# Patient Record
Sex: FEMALE | Race: WHITE | NOT HISPANIC OR LATINO
[De-identification: names, ages, dates, MRNs, and addresses within clinical notes are randomized per-mention and may not be internally consistent; named-entity substitution may affect disease eponyms.]

---

## 2024-07-09 ENCOUNTER — NON-APPOINTMENT (OUTPATIENT)
Age: 74
End: 2024-07-09

## 2024-07-09 ENCOUNTER — APPOINTMENT (OUTPATIENT)
Dept: CARDIOLOGY | Facility: CLINIC | Age: 74
End: 2024-07-09
Payer: MEDICARE

## 2024-07-09 VITALS
SYSTOLIC BLOOD PRESSURE: 128 MMHG | DIASTOLIC BLOOD PRESSURE: 64 MMHG | HEART RATE: 91 BPM | OXYGEN SATURATION: 94 % | BODY MASS INDEX: 25.52 KG/M2 | HEIGHT: 63 IN | WEIGHT: 144 LBS

## 2024-07-09 VITALS — DIASTOLIC BLOOD PRESSURE: 68 MMHG | SYSTOLIC BLOOD PRESSURE: 124 MMHG

## 2024-07-09 DIAGNOSIS — E78.5 HYPERLIPIDEMIA, UNSPECIFIED: ICD-10-CM

## 2024-07-09 DIAGNOSIS — Z86.16 PERSONAL HISTORY OF COVID-19: ICD-10-CM

## 2024-07-09 DIAGNOSIS — R06.02 SHORTNESS OF BREATH: ICD-10-CM

## 2024-07-09 DIAGNOSIS — T46.6X5A ADVERSE EFFECT OF ANTIHYPERLIPIDEMIC AND ANTIARTERIOSCLEROTIC DRUGS, INITIAL ENCOUNTER: ICD-10-CM

## 2024-07-09 DIAGNOSIS — M25.473 EFFUSION, UNSPECIFIED ANKLE: ICD-10-CM

## 2024-07-09 DIAGNOSIS — Z78.9 OTHER SPECIFIED HEALTH STATUS: ICD-10-CM

## 2024-07-09 DIAGNOSIS — J44.9 CHRONIC OBSTRUCTIVE PULMONARY DISEASE, UNSPECIFIED: ICD-10-CM

## 2024-07-09 DIAGNOSIS — R68.84 JAW PAIN: ICD-10-CM

## 2024-07-09 DIAGNOSIS — I25.10 ATHEROSCLEROTIC HEART DISEASE OF NATIVE CORONARY ARTERY W/OUT ANGINA PECTORIS: ICD-10-CM

## 2024-07-09 DIAGNOSIS — Z87.898 PERSONAL HISTORY OF OTHER SPECIFIED CONDITIONS: ICD-10-CM

## 2024-07-09 DIAGNOSIS — R60.0 LOCALIZED EDEMA: ICD-10-CM

## 2024-07-09 DIAGNOSIS — R07.89 OTHER CHEST PAIN: ICD-10-CM

## 2024-07-09 DIAGNOSIS — I25.84 ATHEROSCLEROTIC HEART DISEASE OF NATIVE CORONARY ARTERY W/OUT ANGINA PECTORIS: ICD-10-CM

## 2024-07-09 DIAGNOSIS — F17.200 NICOTINE DEPENDENCE, UNSPECIFIED, UNCOMPLICATED: ICD-10-CM

## 2024-07-09 PROBLEM — Z00.00 ENCOUNTER FOR PREVENTIVE HEALTH EXAMINATION: Status: ACTIVE | Noted: 2024-07-09

## 2024-07-09 PROCEDURE — G2211 COMPLEX E/M VISIT ADD ON: CPT

## 2024-07-09 PROCEDURE — 93000 ELECTROCARDIOGRAM COMPLETE: CPT

## 2024-07-09 PROCEDURE — 99406 BEHAV CHNG SMOKING 3-10 MIN: CPT

## 2024-07-09 RX ORDER — ASPIRIN 81 MG/1
81 TABLET ORAL
Refills: 0 | Status: ACTIVE | COMMUNITY
Start: 2024-07-09

## 2024-07-09 RX ORDER — PANTOPRAZOLE 40 MG/1
40 TABLET, DELAYED RELEASE ORAL DAILY
Qty: 30 | Refills: 0 | Status: ACTIVE | COMMUNITY
Start: 2024-07-09 | End: 1900-01-01

## 2024-07-09 RX ORDER — FLUTICASONE FUROATE, UMECLIDINIUM BROMIDE AND VILANTEROL TRIFENATATE 200; 62.5; 25 UG/1; UG/1; UG/1
200-62.5-25 POWDER RESPIRATORY (INHALATION)
Refills: 0 | Status: ACTIVE | COMMUNITY
Start: 2024-07-09

## 2024-07-09 RX ORDER — ALBUTEROL SULFATE 90 UG/1
108 (90 BASE) INHALANT RESPIRATORY (INHALATION)
Refills: 0 | Status: ACTIVE | COMMUNITY
Start: 2024-07-09

## 2024-07-10 ENCOUNTER — TRANSCRIPTION ENCOUNTER (OUTPATIENT)
Age: 74
End: 2024-07-10

## 2024-07-15 RX ORDER — EVOLOCUMAB 140 MG/ML
140 INJECTION, SOLUTION SUBCUTANEOUS
Qty: 3 | Refills: 3 | Status: ACTIVE | COMMUNITY
Start: 2024-07-09

## 2024-07-23 ENCOUNTER — APPOINTMENT (OUTPATIENT)
Dept: CARDIOLOGY | Facility: CLINIC | Age: 74
End: 2024-07-23
Payer: MEDICARE

## 2024-07-23 PROCEDURE — A9502: CPT

## 2024-07-23 PROCEDURE — 78452 HT MUSCLE IMAGE SPECT MULT: CPT

## 2024-07-23 PROCEDURE — 93015 CV STRESS TEST SUPVJ I&R: CPT

## 2024-08-02 ENCOUNTER — APPOINTMENT (OUTPATIENT)
Dept: CARDIOLOGY | Facility: CLINIC | Age: 74
End: 2024-08-02
Payer: MEDICARE

## 2024-08-02 PROCEDURE — 93306 TTE W/DOPPLER COMPLETE: CPT

## 2024-08-07 ENCOUNTER — NON-APPOINTMENT (OUTPATIENT)
Age: 74
End: 2024-08-07

## 2024-08-07 ENCOUNTER — APPOINTMENT (OUTPATIENT)
Dept: CARDIOLOGY | Facility: CLINIC | Age: 74
End: 2024-08-07

## 2024-08-07 PROBLEM — F17.210 CIGARETTE SMOKER: Status: ACTIVE | Noted: 2024-08-07

## 2024-08-07 PROCEDURE — 99406 BEHAV CHNG SMOKING 3-10 MIN: CPT

## 2024-08-07 PROCEDURE — 93000 ELECTROCARDIOGRAM COMPLETE: CPT | Mod: 59

## 2024-08-07 PROCEDURE — 99214 OFFICE O/P EST MOD 30 MIN: CPT | Mod: 25

## 2024-08-07 NOTE — REASON FOR VISIT
[Symptom and Test Evaluation] : symptom and test evaluation [Hyperlipidemia] : hyperlipidemia [Coronary Artery Disease] : coronary artery disease [FreeTextEntry3] : Dr. Hudson  [FreeTextEntry1] :  74-year-old female is here to review her multiple cardiovascular test.  As you know she was  referred to me for consultation in presence of few months of substernal localized chest pressure pain mainly in the morning when she wakes up sometimes wakes her up at nighttime.  Last for around 3 minutes according to her.  And then she feels flushed sensation radiating to the jaw.  None during activity and exercises.  She remains very active.  And according to her after those 3 minutes in the morning she denies any further symptoms during the daytime.  Denies any relation to meals or respiration. She has no PND orthopnea.  No palpitation dizziness near syncopal syncopal event. She had another episode yesterday. She has no skin rash. She denies any history of myocardial infarction, coronary artery disease, cerebrovascular accident, peripheral artery disease, rheumatic fever, thyroid or Lyme disease. She has no history of hypertension. She does have a history of Extensive history of cigarette smoking Intermittent ankle edema Exertional dyspnea with emphysema Significant adverse reaction to multiple statins in the past in the form of significant myalgia Dyslipidemia.

## 2024-08-07 NOTE — DISCUSSION/SUMMARY
[EKG obtained to assist in diagnosis and management of assessed problem(s)] : EKG obtained to assist in diagnosis and management of assessed problem(s) [FreeTextEntry1] : 74-year-old with above medical history active medical problems as noted below 1.  Chest pain.  .  EKG nonischemic.  Mild coronary calcification score 96.  Nonischemic myocardial perfusion scan with preserved LV ejection fraction.  Possibility of gastroesophageal reflux disease as the etiology of nonexertional symptoms reviewed.  Continue with pantoprazole at present.  If no improvement and continued recurrence then consider invasive coronary angiography guided therapy.  Risk benefits alternatives and options of the further evaluation reviewed.  I have also recommended her to be seen by gastroenterologist.  Information given. Reviewed pathophysiology of atherosclerosis. Atherosclerosis: From the available data evidence of atherosclerosis was reviewed.  We have discussed atherosclerosis is a generalized process.  The pathophysiology of the atherosclerosis  and associated morbidity mortality were reviewed.  Lifestyle modification and risk factors associated with atherosclerosis were discussed.  changes needed in lifestyle and risk factors were reviewed at length.  Understands prevention is the best way  in decreasing morbidity mortality and overall improving long-term prognosis. she will continue with aspirin 81 mg  Pantoprazole 40 mg daily continue with repatha  She understands and agrees with the management plan. Smoking cessation at length discussed. Any high risk symptoms she will call 911 and go to nearest emergency room. 2.  Emphysema.  Pulmonary nodules.  Adrenal adenoma.  Strongly recommend to discuss with PMD see a pulmonologist and have further evaluation management.  Specifically in presence of history of smoking she is at a high risk. 3.  Significant dyslipidemia.  Unable to take statins.  PCSK9 inhibitors as discussed above. 4. cig smoking: Smoking cessation. Counseling done. Relationship with ASCVD, and associated morbidity, mortality was reviewed. Options available discussed.  Counseling regarding low saturated fat, salt and carbohydrate intake was reviewed. Active lifestyle and regular. Exercise along with weight management is advised. I have reviewed above at length. I answered all the questions. Patient verbalized understandings. Thank you very much for allowing me to participate in your patient's care. Please feel free to call me for any questions. Sincerely,  Adeline Barragan MD, FACC, [place]

## 2024-08-07 NOTE — ASSESSMENT
[FreeTextEntry1] : Reviewed on July 9, 2024. EKG as noted above Coronary calcium score.  CT scan July 2, 2024.  Calcium score of 96.  Bilateral pulmonary nodule.  Approximate 2.2 cm.  Adrenal nodule. Labs from March 14, 2024.  CBC stable.  Sodium 139 potassium 4.5 creatinine 0.8 hemoglobin A1c 5.4.  Total cholesterol 273 triglycerides 122 HDL 68 .  Reviewed on August 7, 2024. Echocardiogram, nuclear myocardial perfusion scan were reviewed. EKG done today reviewed.

## 2024-08-07 NOTE — CARDIOLOGY SUMMARY
[de-identified] : July 9, 2024.  Normal sinus rhythm 8/7/24 nsr low voltage [de-identified] : Pharmacological myocardial perfusion scan.  Nonischemic.  EF 66%. [de-identified] : August 2, 2024.  LVEF .62%.  Trace to mild AR mild lower mild TR mild mitral stenosis [de-identified] : Coronary calcium score.  July 2, 2024.  CCS score of 96.  Bilateral pulmonary nodules.

## 2024-08-07 NOTE — COUNSELING
[Cessation strategies including cessation program discussed] : Cessation strategies including cessation program discussed [Use of nicotine replacement therapies and other medications discussed] : Use of nicotine replacement therapies and other medications discussed [Encouraged to pick a quit date and identify support needed to quit] : Encouraged to pick a quit date and identify support needed to quit [Smoking Cessation Program Referral] : Smoking Cessation Program Referral  [Yes] : Willing to quit smoking [FreeTextEntry2] : Understands all the options available.  She is unable to quit at present. [FreeTextEntry1] : 3

## 2024-09-18 ENCOUNTER — APPOINTMENT (OUTPATIENT)
Dept: CARDIOLOGY | Facility: CLINIC | Age: 74
End: 2024-09-18

## 2025-02-15 ENCOUNTER — TRANSCRIPTION ENCOUNTER (OUTPATIENT)
Age: 75
End: 2025-02-15

## 2025-02-18 ENCOUNTER — TRANSCRIPTION ENCOUNTER (OUTPATIENT)
Age: 75
End: 2025-02-18